# Patient Record
Sex: MALE | Race: BLACK OR AFRICAN AMERICAN | Employment: FULL TIME | URBAN - METROPOLITAN AREA
[De-identification: names, ages, dates, MRNs, and addresses within clinical notes are randomized per-mention and may not be internally consistent; named-entity substitution may affect disease eponyms.]

---

## 2019-08-19 ENCOUNTER — HOSPITAL ENCOUNTER (EMERGENCY)
Age: 59
Discharge: HOME | End: 2019-08-19
Attending: EMERGENCY MEDICINE

## 2019-08-19 ENCOUNTER — APPOINTMENT (OUTPATIENT)
Dept: CT IMAGING | Age: 59
End: 2019-08-19

## 2019-08-19 VITALS
SYSTOLIC BLOOD PRESSURE: 164 MMHG | TEMPERATURE: 98.8 F | DIASTOLIC BLOOD PRESSURE: 94 MMHG | OXYGEN SATURATION: 97 % | BODY MASS INDEX: 22.08 KG/M2 | HEIGHT: 62 IN | RESPIRATION RATE: 16 BRPM | WEIGHT: 120 LBS | HEART RATE: 73 BPM

## 2019-08-19 DIAGNOSIS — D72.819 LEUKOPENIA, UNSPECIFIED TYPE: ICD-10-CM

## 2019-08-19 DIAGNOSIS — F10.920 ACUTE ALCOHOLIC INTOXICATION WITHOUT COMPLICATION (HCC): ICD-10-CM

## 2019-08-19 DIAGNOSIS — R55 SYNCOPE AND COLLAPSE: Primary | ICD-10-CM

## 2019-08-19 DIAGNOSIS — Z53.29 LEFT AGAINST MEDICAL ADVICE: ICD-10-CM

## 2019-08-19 LAB
A/G RATIO: 1.7 (ref 1.1–2.2)
ALBUMIN SERPL-MCNC: 4.6 G/DL (ref 3.4–5)
ALP BLD-CCNC: 28 U/L (ref 40–129)
ALT SERPL-CCNC: 33 U/L (ref 10–40)
ANION GAP SERPL CALCULATED.3IONS-SCNC: 13 MMOL/L (ref 3–16)
AST SERPL-CCNC: 52 U/L (ref 15–37)
BASOPHILS ABSOLUTE: 0 K/UL (ref 0–0.2)
BASOPHILS RELATIVE PERCENT: 0.9 %
BILIRUB SERPL-MCNC: 1 MG/DL (ref 0–1)
BILIRUBIN URINE: NEGATIVE
BLOOD, URINE: NEGATIVE
BUN BLDV-MCNC: 7 MG/DL (ref 7–20)
CALCIUM SERPL-MCNC: 8.9 MG/DL (ref 8.3–10.6)
CHLORIDE BLD-SCNC: 99 MMOL/L (ref 99–110)
CLARITY: CLEAR
CO2: 22 MMOL/L (ref 21–32)
COLOR: YELLOW
CREAT SERPL-MCNC: 0.6 MG/DL (ref 0.9–1.3)
EOSINOPHILS ABSOLUTE: 0 K/UL (ref 0–0.6)
EOSINOPHILS RELATIVE PERCENT: 0.2 %
ETHANOL: 217 MG/DL (ref 0–0.08)
GFR AFRICAN AMERICAN: >60
GFR NON-AFRICAN AMERICAN: >60
GLOBULIN: 2.7 G/DL
GLUCOSE BLD-MCNC: 117 MG/DL (ref 70–99)
GLUCOSE BLD-MCNC: 120 MG/DL (ref 70–99)
GLUCOSE URINE: NEGATIVE MG/DL
HCT VFR BLD CALC: 34.9 % (ref 40.5–52.5)
HEMOGLOBIN: 11.7 G/DL (ref 13.5–17.5)
KETONES, URINE: NEGATIVE MG/DL
LEUKOCYTE ESTERASE, URINE: NEGATIVE
LYMPHOCYTES ABSOLUTE: 0.8 K/UL (ref 1–5.1)
LYMPHOCYTES RELATIVE PERCENT: 35.5 %
MCH RBC QN AUTO: 31.9 PG (ref 26–34)
MCHC RBC AUTO-ENTMCNC: 33.6 G/DL (ref 31–36)
MCV RBC AUTO: 95 FL (ref 80–100)
MICROSCOPIC EXAMINATION: NORMAL
MONOCYTES ABSOLUTE: 0.3 K/UL (ref 0–1.3)
MONOCYTES RELATIVE PERCENT: 11.6 %
NEUTROPHILS ABSOLUTE: 1.1 K/UL (ref 1.7–7.7)
NEUTROPHILS RELATIVE PERCENT: 51.8 %
NITRITE, URINE: NEGATIVE
PDW BLD-RTO: 15.2 % (ref 12.4–15.4)
PERFORMED ON: ABNORMAL
PH UA: 5.5 (ref 5–8)
PLATELET # BLD: 140 K/UL (ref 135–450)
PMV BLD AUTO: 8.3 FL (ref 5–10.5)
POTASSIUM SERPL-SCNC: 3.5 MMOL/L (ref 3.5–5.1)
PROTEIN UA: NEGATIVE MG/DL
RBC # BLD: 3.67 M/UL (ref 4.2–5.9)
SODIUM BLD-SCNC: 134 MMOL/L (ref 136–145)
SPECIFIC GRAVITY UA: <1.005 (ref 1–1.03)
TOTAL PROTEIN: 7.3 G/DL (ref 6.4–8.2)
TROPONIN: <0.01 NG/ML
URINE REFLEX TO CULTURE: NORMAL
URINE TYPE: NORMAL
UROBILINOGEN, URINE: 0.2 E.U./DL
WBC # BLD: 2.2 K/UL (ref 4–11)

## 2019-08-19 PROCEDURE — 36415 COLL VENOUS BLD VENIPUNCTURE: CPT

## 2019-08-19 PROCEDURE — 99285 EMERGENCY DEPT VISIT HI MDM: CPT

## 2019-08-19 PROCEDURE — G0480 DRUG TEST DEF 1-7 CLASSES: HCPCS

## 2019-08-19 PROCEDURE — 84484 ASSAY OF TROPONIN QUANT: CPT

## 2019-08-19 PROCEDURE — 85025 COMPLETE CBC W/AUTO DIFF WBC: CPT

## 2019-08-19 PROCEDURE — 80053 COMPREHEN METABOLIC PANEL: CPT

## 2019-08-19 PROCEDURE — 81003 URINALYSIS AUTO W/O SCOPE: CPT

## 2019-08-19 RX ORDER — 0.9 % SODIUM CHLORIDE 0.9 %
1000 INTRAVENOUS SOLUTION INTRAVENOUS ONCE
Status: DISCONTINUED | OUTPATIENT
Start: 2019-08-19 | End: 2019-08-20 | Stop reason: HOSPADM

## 2019-08-19 ASSESSMENT — ENCOUNTER SYMPTOMS
CHEST TIGHTNESS: 0
DIARRHEA: 0
ABDOMINAL PAIN: 0
NAUSEA: 0
SHORTNESS OF BREATH: 0
VOMITING: 0

## 2019-08-19 ASSESSMENT — PAIN SCALES - GENERAL: PAINLEVEL_OUTOF10: 3

## 2019-08-20 NOTE — ED PROVIDER NOTES
declined any further testing as he indicated he was ready to leave. Though he has an elevated alcohol level, I felt he had capacity to make his own decisions and his family members were there to witness our discussion. We did discharge him AMA since we had planned to do other diagnostic tests that he was not willing to wait to have performed. Tucker Zhou was given appropriate AMA discharge instructions. Referral to follow up provider. For further details of [facility] encounter, please see documentation by advanced practice provider Shaylee Parra CNP.     Labs Reviewed   CBC WITH AUTO DIFFERENTIAL - Abnormal; Notable for the following components:       Result Value    WBC 2.2 (*)     RBC 3.67 (*)     Hemoglobin 11.7 (*)     Hematocrit 34.9 (*)     Neutrophils # 1.1 (*)     Lymphocytes # 0.8 (*)     All other components within normal limits    Narrative:     Performed at:  [facility] [address],  [place]   Phone [phone]   COMPREHENSIVE METABOLIC PANEL - Abnormal; Notable for the following components:    Sodium 134 (*)     Glucose 120 (*)     CREATININE 0.6 (*)     Alkaline Phosphatase 28 (*)     AST 52 (*)     All other components within normal limits    Narrative:     Performed at:  [facility] [address], [place], [employer]   Phone [phone]   POCT GLUCOSE - Abnormal; Notable for the following components:    POC Glucose 117 (*)     All other components within normal limits    Narrative:     Performed at:  [facility] [address],  [place]   Phone [phone]   ETHANOL    Narrative:     Performed at:  [facility] [address],  [place]   Phone [phone]   URINE RT REFLEX TO CULTURE    Narrative:     Performed at:  [facility]  [address] [place] [address]

## 2019-08-20 NOTE — ED PROVIDER NOTES
[facility]        Pt Name: Tierra Matos  MRN: [id number]  [place] 1960  Date of evaluation: 8/19/2019  Provider: EVAN Niño CNP  PCP: No primary care provider on file. This patient was seen and evaluated by the attending physician Cara Luna, *. CHIEF COMPLAINT       Chief Complaint   Patient presents with    Hypoglycemia     Patient arrived via [employer] squad from work, BS was 64 when they picked up patient, given oral glucose,  in triage. Patient admits to drinking beer before work. Poor historian. HISTORY OF PRESENT ILLNESS   (Location/Symptom, Timing/Onset, Context/Setting, Quality, Duration, Modifying Factors, Severity)  Note limiting factors. Tierra Matos is a 64 y.o. male presents to the emergency department after syncopal event while at work. The patient does admit that he drank beer tonight prior to work and does drink every day before he works. Patient works as a . EMS was called for possible low blood sugar, the patient's blood sugar was found just above 50 and was given oral glucose, 117 in triage. Patient denies complaints. Patient's daughter does appear angry that the patient is in the emergency department, she states multiple times \"he just drank too much, no reason to spend all this money\". .    Denies any headache, fever, visual disturbances. No chest pain or pressure. No neck or back pain. No shortness of breath, cough, or congestion. No abdominal pain, nausea, vomiting, diarrhea, constipation, or dysuria. No rash. Nursing Notes were all reviewed and agreed with or any disagreements were addressed  in the HPI. REVIEW OF SYSTEMS    (2-9 systems for level 4, 10 or more for level 5)     Review of Systems   Constitutional: Negative for activity change, chills and fever. Respiratory: Negative for chest tightness and shortness of breath. Cardiovascular: Negative for chest pain. Gastrointestinal: Negative for abdominal pain, diarrhea, nausea and vomiting. Genitourinary: Negative for dysuria. Neurological: Positive for syncope. All other systems reviewed and are negative. Positives and Pertinent negatives as per HPI. Except as noted abovein the ROS, all other systems were reviewed and negative. PAST MEDICAL HISTORY     Past Medical History:   Diagnosis Date    Headache          SURGICAL HISTORY   History reviewed. No pertinent surgical history. CURRENTMEDICATIONS       There are no discharge medications for this patient. ALLERGIES     Patient has no known allergies. FAMILYHISTORY     History reviewed. No pertinent family history. SOCIAL HISTORY       Social History     Socioeconomic History    Marital status: Single     Spouse name: None    Number of children: None    Years of education: None    Highest education level: None   Occupational History    None   Social Needs    Financial resource strain: None    Food insecurity:     Worry: None     Inability: None    Transportation needs:     Medical: None     Non-medical: None   Tobacco Use    Smoking status: Current Some Day Smoker    Smokeless tobacco: Never Used   Substance and Sexual Activity    Alcohol use:  Yes     Alcohol/week: 35.0 standard drinks     Types: 35 Cans of beer per week    Drug use: Never    Sexual activity: None   Lifestyle    Physical activity:     Days per week: None     Minutes per session: None    Stress: None   Relationships    Social connections:     Talks on phone: None     Gets together: None     Attends Baptism service: None     Active member of club or organization: None     Attends meetings of clubs or organizations: None     Relationship status: None    Intimate partner violence:     Fear of current or ex partner: None     Emotionally abused: None     Physically abused: None     Forced sexual activity: None

## 2019-08-20 NOTE — ED NOTES
PT and family called out and stated they are ready to leave. PT states he wants to be discharged. Dr Ale Jameson at bedside.      Maggie Renner, RN  08/19/19 [id number]

## 2024-01-11 ENCOUNTER — HOSPITAL ENCOUNTER (EMERGENCY)
Age: 64
Discharge: HOME | End: 2024-01-11
Attending: EMERGENCY MEDICINE

## 2024-01-11 VITALS
BODY MASS INDEX: 23.57 KG/M2 | RESPIRATION RATE: 18 BRPM | HEART RATE: 69 BPM | OXYGEN SATURATION: 98 % | HEIGHT: 62 IN | TEMPERATURE: 98 F | WEIGHT: 128.09 LBS

## 2024-01-11 DIAGNOSIS — K08.89 PAIN, DENTAL: Primary | ICD-10-CM

## 2024-01-11 PROCEDURE — 99283 EMERGENCY DEPT VISIT LOW MDM: CPT

## 2024-01-11 RX ORDER — AMOXICILLIN 500 MG/1
500 CAPSULE ORAL 2 TIMES DAILY
Qty: 20 CAPSULE | Refills: 0 | Status: SHIPPED | OUTPATIENT
Start: 2024-01-11 | End: 2024-01-21

## 2024-01-11 RX ORDER — AMOXICILLIN 500 MG/1
500 CAPSULE ORAL 2 TIMES DAILY
Qty: 20 CAPSULE | Refills: 0 | Status: SHIPPED | OUTPATIENT
Start: 2024-01-11 | End: 2024-01-11

## 2024-01-11 RX ORDER — PREDNISONE 50 MG/1
50 TABLET ORAL DAILY
Qty: 5 TABLET | Refills: 0 | Status: SHIPPED | OUTPATIENT
Start: 2024-01-11 | End: 2024-01-16

## 2024-01-11 ASSESSMENT — LIFESTYLE VARIABLES
HOW MANY STANDARD DRINKS CONTAINING ALCOHOL DO YOU HAVE ON A TYPICAL DAY: 1 OR 2
HOW OFTEN DO YOU HAVE A DRINK CONTAINING ALCOHOL: 4 OR MORE TIMES A WEEK

## 2024-01-11 ASSESSMENT — PAIN SCALES - GENERAL: PAINLEVEL_OUTOF10: 9

## 2024-01-11 ASSESSMENT — PAIN - FUNCTIONAL ASSESSMENT: PAIN_FUNCTIONAL_ASSESSMENT: 0-10

## 2024-01-11 ASSESSMENT — PAIN DESCRIPTION - FREQUENCY: FREQUENCY: CONTINUOUS

## 2024-01-11 ASSESSMENT — PAIN DESCRIPTION - PAIN TYPE: TYPE: ACUTE PAIN

## 2024-01-11 ASSESSMENT — PAIN DESCRIPTION - LOCATION: LOCATION: MOUTH

## 2024-01-12 NOTE — ED PROVIDER NOTES
MEDICAL DECISION MAKING PROCESS.    The primary clinician of record Pollo Vásquez     CRITICAL CARE TIME   Total Critical Caretime was 0 minutes, excluding separately reportable procedures.  There was a high probability of clinically significant/life threatening deterioration in the patient's condition which required my urgent intervention.      CRITICAL CARE  I personally saw the patient and independently provided 0 minutes of non-concurrent critical care out of the total shared critical care time provided. This excludes seperately billable procedures. Critical care time was provided for patient as above that required close evaluation and/or intervention with concern for potential patient decompensation.    PROCEDURES:  Unlessotherwise noted below, none    FINAL IMPRESSION      1. Pain, dental          DISPOSITION/PLAN   DISPOSITION Decision To Discharge 01/11/2024 11:33:25 PM    PATIENT REFERRED TO:  Dentist            DISCHARGE MEDICATIONS:  New Prescriptions    AMOXICILLIN (AMOXIL) 500 MG CAPSULE    Take 1 capsule by mouth 2 times daily for 10 days    PREDNISONE (DELTASONE) 50 MG TABLET    Take 1 tablet by mouth daily for 5 days          (Please note that portions ofthis note were completed with a voice recognition program.  Efforts were made to edit the dictations but occasionally words are mis-transcribed.)    Pollo Vásquez MD(electronically signed)  Attending Emergency Physician            Pollo Vásquez MD  01/11/24 [id number]

## 2024-01-12 NOTE — ED TRIAGE NOTES
Pt arrives ambulatory for eval of lip swelling, sts he was trying to yank out his tooth with some pliers, denies mouth swelling, denies throat swelling, denies breathing/swallowing issues. Pt is a/xo4, rsp nonlabored and skin race appropriate, warm and dry